# Patient Record
Sex: FEMALE | Race: WHITE | Employment: OTHER | URBAN - METROPOLITAN AREA
[De-identification: names, ages, dates, MRNs, and addresses within clinical notes are randomized per-mention and may not be internally consistent; named-entity substitution may affect disease eponyms.]

---

## 2017-11-28 ENCOUNTER — HOSPITAL ENCOUNTER (OUTPATIENT)
Dept: PHYSICAL THERAPY | Age: 82
Discharge: HOME | End: 2017-11-28
Attending: INTERNAL MEDICINE
Payer: COMMERCIAL

## 2017-11-28 DIAGNOSIS — M25.519 SHOULDER PAIN, UNSPECIFIED CHRONICITY, UNSPECIFIED LATERALITY: ICD-10-CM

## 2017-11-28 PROCEDURE — G8984 CARRY CURRENT STATUS: HCPCS

## 2017-11-28 PROCEDURE — 97161 PT EVAL LOW COMPLEX 20 MIN: CPT

## 2017-11-28 PROCEDURE — G8985 CARRY GOAL STATUS: HCPCS

## 2017-11-28 NOTE — PROGRESS NOTES
Ambulatory/Rehab Services H2 Model Falls Risk Assessment    Risk Factor Pts. ·   Confusion/Disorientation/Impulsivity  []    4 ·   Symptomatic Depression  []   2 ·   Altered Elimination  [x]   1 ·   Dizziness/Vertigo  []   1 ·   Gender (Male)  []   1 ·   Any administered antiepileptics (anticonvulsants):  []   2 ·   Any administered benzodiazepines:  []   1 ·   Visual Impairment (specify):  []   1 ·   Portable Oxygen Use  []   1 ·   Orthostatic ? BP  [x]   1 ·   History of Recent Falls (within 3 mos.)  []   5     Ability to Rise from Chair (choose one) Pts. ·   Ability to rise in a single movement  [x]   0 ·   Pushes up, successful in one attempt  []   1 ·   Multiple attempts, but successful  []   3 ·   Unable to rise without assistance  []   4   Total: (5 or greater = High Risk) 2     Falls Prevention Plan:   []                Physical Limitations to Exercise (specify):   []                Mobility Assistance Device (type):   []                Exercise/Equipment Adaptation (specify):    ©2010 [facility] [address] Patent #[id number].  Federal Law prohibits the replication, distribution or use without written permission from [facility] [employer]

## 2017-11-28 NOTE — THERAPY EVALUATION
Hosea Irwin  : 1934  Primary: Rivera Curtis [facility]  Secondary:  [address] at [facility] SAIDA BUSH  [address] [facility], [address] [place] [address],[address], [facility] [address]  [phone]   [phone]         OUTPATIENT PHYSICAL THERAPY:Initial Assessment 2017    ICD-10: Treatment Diagnosis: Pain in left shoulder (M25.512)  Precautions/Allergies: NKDA     Fall Risk Score: 2 (? 5 = High Risk)  MD Orders:Eval and treat MEDICAL/REFERRING DIAGNOSIS:  Shoulder pain, unspecified chronicity, unspecified laterality [M25.519]    DATE OF ONSET: about 2 months ago  REFERRING PHYSICIAN: Celestina Mensah MD  RETURN PHYSICIAN APPOINTMENT: TBD     INITIAL ASSESSMENT:  Ms. Chaparrita Bates is a 80year old R hand dominant female with complaints of L shoulder pain over the past couple of months. She presents with severe pain in L shoulder, decreased ROM and strength in L shoulder and decreased functional use of L UE. She could benefit from PT to address defiicits and work toward goals. She also plans to see shoulder specialist.    PROBLEM LIST (Impacting functional limitations):  1. Decreased Strength  2. Decreased ADL/Functional Activities  3. Increased Pain  4. Decreased Flexibility/Joint Mobility INTERVENTIONS PLANNED:  1. Home Exercise Program (HEP)  2. Manual Therapy  3. Therapeutic Exercise/Strengthening  4. modals as needed   TREATMENT PLAN:  Effective Dates: 17 TO 18. Frequency/Duration: 1-2 times a week for 90 days  GOALS: (Goals have been discussed and agreed upon with patient.)  Short-Term Functional Goals: Time Frame: 6 weeks  1. Patient to be independent with HEP  2. Patient to report decrease in shoulder pain to <= 3  Discharge Goals: Time Frame: 90 days  1. Patient to report no more than minimal L shoulder pain with functional activity  2. Patient to improve DASH score to 19 to demo improved use of L UE  3. Patient to increase AROM L shoulder flexion to 135 degrees for improved use of L UE. Rehabilitation Potential For Stated Goals: guarded  Regarding Chaparrita Ruffin's therapy, I certify that the treatment plan above will be carried out by a therapist or under their direction. Thank you for this referral,    Amisha Gordon PT     Referring Physician Signature: Nina Mcgill MD              Date                    The information in this section was collected on 17 (except where otherwise noted). HISTORY:   History of Present Injury/Illness (Reason for Referral):  Patient reports that L shoulder pain started about 2 months ago. She went to MD and had 2 separate injections, each of which lasted a couple of weeks. Shoulder seems to be getting worse now and no xrays have been done yet. She can't sleep due to pain. She expressed desire to see a shoulder specialist.   Past Medical History/Comorbidities: CAD, Cognitive impairment, insomnia, left ventricular dysfunction, appendectomy, bunionextomy, heart catheterization, carpal tunnel release, cataract removal, , CABG. Social History/Living Environment:     lives with . 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 Dominant Side:         RIGHT    Current Medications:  Aspirin, Atorvastatin, Calcium, Magnesium oxide, Multivitamin, Mirtazapine, Donepezil    Date Last Reviewed:  17   Number of Personal Factors/Comorbidities that affect the Plan of Care: 1-2: MODERATE COMPLEXITY   EXAMINATION:   Observation/Orthostatic Postural Assessment:          Rounded shoulder posture. Palpation:         Tender L lateral shoulder, over bursa. ROM:          AROM R shoulder flex 150, abduct 150, extn 60, ER 65 and IR T10        AROM L shoulder flex 80, abduct 64, extn 40, ER 48 and IR to top of L buttock.   PROM L shoulder flex 90, abduct 70, ER 45 and IR 50 in 45 degree abduct  Strength:          R UE grossly 4=/5 except 4/5 in ER        L UE <3 and painful throughout  Special Tests:          Positive impingement tests L shoulder. Neurological Screen:        Sensation: light touch intact in B UEs       Body Structures Involved:  1. Joints  2. Muscles Body Functions Affected:  1. Neuromusculoskeletal  2. Movement Related Activities and Participation Affected:  1. General Tasks and Demands  2. Self Care  3. Community, Social and [place]   Number of elements (examined above) that affect the Plan of Care: 1-2: LOW COMPLEXITY   CLINICAL PRESENTATION:   Presentation: Evolving clinical presentation with changing clinical characteristics: MODERATE COMPLEXITY   CLINICAL DECISION MAKING:   Outcome Measure: Tool Used: Disabilities of the Arm, Shoulder and Hand (DASH) Questionnaire - Quick Version  Score:  Initial: 38/55  Most Recent: X/55 (Date: -- )   Interpretation of Score: The DASH is designed to measure the activities of daily living in person's with upper extremity dysfunction or pain. Each section is scored on a 1-5 scale, 5 representing the greatest disability. The scores of each section are added together for a total score of 55. Score 11 12-19 20-28 29-37 38-45 46-54 55   Modifier CH CI CJ CK CL CM CN     ? Carrying, Moving, and Handling Objects:     - CURRENT STATUS: CL - 60%-79% impaired, limited or restricted    - GOAL STATUS: CI - 1%-19% impaired, limited or restricted    - D/C STATUS:  ---------------To be determined---------------        Medical Necessity:   · Patient demonstrates guarded rehab potential due to higher previous functional level. Reason for Services/Other Comments:  · Patient continues to require skilled intervention due to need to reduce shoulder pain.    Use of outcome tool(s) and clinical judgement create a POC that gives a: Questionable prediction of patient's progress: MODERATE COMPLEXITY            TREATMENT:   (In addition to Assessment/Re-Assessment sessions the following treatments were rendered)  Pre-treatment Symptoms/Complaints:  Pain in L shoulder. Pain: Initial:   Pain Intensity 1: 7 (currently 7, worst of 10)   Post Session:  No change in pain levels        No treatment today. Patient is going to see orthopedic doctor before proceeding. Treatment/Session Assessment:    · Response to Treatment:  No increase in pain. Patient was planning to see ortho next week. · Compliance with Program/Exercises: Will assess as treatment progresses. · Recommendations/Intent for next treatment session: \"Next visit will focus on decreasing pain. \".   Total Treatment Duration: 50 minutes  PT Patient Time In/Time Out  Time In: 1000  Time Out: 1050Steven Dave Farrell PT

## 2017-12-19 ENCOUNTER — HOSPITAL ENCOUNTER (OUTPATIENT)
Dept: MAMMOGRAPHY | Age: 82
Discharge: HOME | End: 2017-12-19
Attending: INTERNAL MEDICINE
Payer: COMMERCIAL

## 2017-12-19 DIAGNOSIS — Z12.31 VISIT FOR SCREENING MAMMOGRAM: ICD-10-CM

## 2017-12-19 PROCEDURE — 77067 SCR MAMMO BI INCL CAD: CPT

## 2018-01-16 NOTE — THERAPY DISCHARGE
Tommy Antoine  : 1934  Primary: Radha Mijares [facility]  Secondary:  [address]  at [facility] SAIDA BUSH  [address] [facility], [address] [place] [address] [address], [place] [address]  [phone]   [phone]         OUTPATIENT PHYSICAL THERAPY:Discontinuation Summary 2017    ICD-10: Treatment Diagnosis: Pain in left shoulder (M25.512)  Precautions/Allergies: NKDA     Fall Risk Score: 2 (? 5 = High Risk)  MD Orders:Eval and treat MEDICAL/REFERRING DIAGNOSIS:  Shoulder pain, unspecified chronicity, unspecified laterality [M25.519]    DATE OF ONSET: about 2 months ago  REFERRING PHYSICIAN: Hannah Boudreaux MD  RETURN PHYSICIAN APPOINTMENT: TBD     INITIAL ASSESSMENT:  Ms. Amber Dave is a 80year old R hand dominant female with complaints of L shoulder pain over the past couple of months. She presented with severe pain in L shoulder, decreased ROM and strength in L shoulder and decreased functional use of L UE. She did not return to PT after initial evaluation and she will now be discontinued from PT. PLAN: Discontinue PT. Unable to assess progress toward goals. GOALS: (Goals have been discussed and agreed upon with patient.)  Short-Term Functional Goals: Time Frame: 6 weeks  1. Patient to be independent with HEP  2. Patient to report decrease in shoulder pain to <= 3  Discharge Goals: Time Frame: 90 days  1. Patient to report no more than minimal L shoulder pain with functional activity  2. Patient to improve DASH score to 19 to demo improved use of L UE  3. Patient to increase AROM L shoulder flexion to 135 degrees for improved use of L UE. Rehabilitation Potential For Stated Goals: guarded     Thank you for this referral,    Diego Camarillo, PT                The information in this section was collected on 17 (except where otherwise noted). HISTORY:   History of Present Injury/Illness (Reason for Referral):  Patient reports that L shoulder pain started about 2 months ago.  She went to MD and had 2 separate injections, each of which lasted a couple of weeks. Shoulder seems to be getting worse now and no xrays have been done yet. She can't sleep due to pain. She expressed desire to see a shoulder specialist.   Past Medical History/Comorbidities: CAD, Cognitive impairment, insomnia, left ventricular dysfunction, appendectomy, bunionextomy, heart catheterization, carpal tunnel release, cataract removal, , CABG. Social History/Living Environment:     lives with . 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 Dominant Side:         RIGHT    Current Medications:  Aspirin, Atorvastatin, Calcium, Magnesium oxide, Multivitamin, Mirtazapine, Donepezil    Date Last Reviewed:  17   EXAMINATION:   Observation/Orthostatic Postural Assessment:          Rounded shoulder posture. Palpation:         Tender L lateral shoulder, over bursa. ROM:          AROM R shoulder flex 150, abduct 150, extn 60, ER 65 and IR T10        AROM L shoulder flex 80, abduct 64, extn 40, ER 48 and IR to top of L buttock. PROM L shoulder flex 90, abduct 70, ER 45 and IR 50 in 45 degree abduct  Strength:          R UE grossly 4=/5 except 4/5 in ER        L UE <3 and painful throughout  Special Tests:          Positive impingement tests L shoulder. Neurological Screen:        Sensation: light touch intact in B UEs       Body Structures Involved:  1. Joints  2. Muscles Body Functions Affected:  1. Neuromusculoskeletal  2. Movement Related Activities and Participation Affected:  1. General Tasks and Demands  2. Self Care  3. Community, Social and Civ[employer] [place]   CLINICAL DECISION MAKING:   Outcome Measure: Tool Used: Disabilities of the Arm, Shoulder and Hand (DASH) Questionnaire - Quick Version  Score:  Initial:   Most Recent:  (Date: -- )   Interpretation of Score:  The DASH is designed to measure the activities of daily living in person's with upper extremity dysfunction or pain. Each section is scored on a 1-5 scale, 5 representing the greatest disability. The scores of each section are added together for a total score of 55. Score 11 12-19 20-28 29-37 38-45 46-54 55   Modifier CH CI CJ CK CL CM CN     ? Carrying, Moving, and Handling Objects:     - CURRENT STATUS: CL - 60%-79% impaired, limited or restricted    - GOAL STATUS: CI - 1%-19% impaired, limited or restricted    - D/C STATUS:  ---------------To be determined---------------        Medical Necessity:   · Patient demonstrates guarded rehab potential due to higher previous functional level. Reason for Services/Other Comments:  · Patient continues to require skilled intervention due to need to reduce shoulder pain. TREATMENT:   (In addition to Assessment/Re-Assessment sessions the following treatments were rendered)  Pre-treatment Symptoms/Complaints:  Pain in L shoulder. Pain: Initial:   Pain Intensity 1: 7 (currently 7, worst of 10)   Post Session:  No change in pain levels        No treatment today. Patient is going to see orthopedic doctor before proceeding.       Debbi Ferrell, PT

## 2018-02-22 ENCOUNTER — HOSPITAL ENCOUNTER (OUTPATIENT)
Dept: PHYSICAL THERAPY | Age: 83
Discharge: HOME | End: 2018-02-22
Payer: COMMERCIAL

## 2018-02-22 PROCEDURE — G8984 CARRY CURRENT STATUS: HCPCS

## 2018-02-22 PROCEDURE — 97161 PT EVAL LOW COMPLEX 20 MIN: CPT

## 2018-02-22 PROCEDURE — G8985 CARRY GOAL STATUS: HCPCS

## 2018-02-22 NOTE — THERAPY EVALUATION
Marita Cota  : 1934  Primary: Claudia Carrera Karen Hassan  Secondary:  [facility] SAIDA RANDOLPH[address], [address] [place] [address],[address], [facility] [address]  [phone]   [phone]         OUTPATIENT PHYSICAL THERAPY:Initial Assessment 2018    ICD-10: Treatment Diagnosis: Pain in left shoulder (M25.512)  Precautions/Allergies: NKDA     Fall Risk Score: 7 (? 5 = High Risk)  MD Orders: Eval and treat, HEP, ROM, strengthening, aggressive motion 3x/wk for 5 weeks MEDICAL/REFERRING DIAGNOSIS:  L shoulder pain  DATE OF ONSET: ? - patient unsure  REFERRING PHYSICIAN: Simon Lauren MD  RETURN PHYSICIAN APPOINTMENT: TBD     INITIAL ASSESSMENT:  Ms. Maria Fernanda Garner is a 80year old R hand dominant female whose L shoulder pain started after she moved a mattress. She presents with L shoulder pain, very decreased ROM and strength in L shoulder and decreased functional use of L UE. She could benefit from PT to address deficits and work toward goals. PROBLEM LIST (Impacting functional limitations):  1. Decreased Strength  2. Decreased ADL/Functional Activities  3. Increased Pain  4. Decreased Flexibility/Joint Mobility INTERVENTIONS PLANNED:  1. Home Exercise Program (HEP)  2. Manual Therapy  3. Range of Motion (ROM)  4. Therapeutic Exercise/Strengthening  5. modals as needed   TREATMENT PLAN:  Effective Dates:18 TO 18. Frequency/Duration: 2-3 times a week for 90 Days  GOALS: (Goals have been discussed and agreed upon with patient.)  Patient's stated goal is to get rid of pain and get normal use of L UE back. Short-Term Functional Goals: Time Frame: 6 weeks  1. Patient to be independent with HEP  2. Patient to report decrease in resting pain levels to 4/10.  3. Patient to increase PROM L shoulder flex to 120 degrees  Discharge Goals: Time Frame: 90 days  1. Patient to report no more than minimal L shoulder pain with all activity  2.  Patient to improve DASH score to 24 to demo improved use of L UE  3. Patient to increase AROM L shoulder flex to 135 for improved functional use  Rehabilitation Potential For Stated Goals: [address], I certify that the treatment plan above will be carried out by a therapist or under their direction. Thank you for this referral,      Mary Dawson, PT     Referring Physician Signature: Yessi Connor MD          Date                 Do not click here        The information in this section was collected on 18 (except where otherwise noted). HISTORY:   History of Present Injury/Illness (Reason for Referral):  Patient reports she injured her shoulder around last November when she moved a mattress at home. She came for PT eval, but fell and broke 'tail bone' after that and couldn't return to PT. She went to see Dr. Katy Vargas and shoulder was injected and that provided relief for a couple of months, but then the effects wore off. She is now referred to PT. Past Medical History/Comorbidities: CAD, Cognitive impairment, insomnia, left ventricular dysfunction, appendectomy, bunionextomy, heart catheterization, carpal tunnel release, cataract removal, , CABG. 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 Dominant Side:         RIGHT  Current Medications:  Aspirin, Atorvastatin, Calcium, Magnesium oxide, Multivitamin, Mirtazapine, Donepezil - No change since last PT, per patient.    Date Last Reviewed:  18   Number of Personal Factors/Comorbidities that affect the Plan of Care: 1-2: MODERATE COMPLEXITY   EXAMINATION:   Observation/Orthostatic Postural Assessment:          Rounded shoulder posture  Palpation:          Tender over whole L shoulder, especially anterior  ROM:          AROM R shoulder flex 140, extn 60, abduct 155, ER 60, IR T9        AROM L shoulder flex 68, extn 30, abduct 60, ER 10 and IR to L hip        PROM L shoulder flex 85, abduct 60, ER 65 and IR 45 at 45 degree abduct  Strength:          R shoulder 5 except 4+ in ER. L shoulder painful with strength testing in all directions  Special Tests:          Could not elevate L shoulder enough for special tests  Neurological Screen:        Sensation: light touch intact in B UEs   Body Structures Involved:  1. Joints  2. Muscles Body Functions Affected:  1. Neuromusculoskeletal  2. Movement Related Activities and Participation Affected:  1. General Tasks and Demands  2. Self Care  3. Community, Social and [place]   Number of elements (examined above) that affect the Plan of Care: 1-2: LOW COMPLEXITY   CLINICAL PRESENTATION:   Presentation: Evolving clinical presentation with changing clinical characteristics: MODERATE COMPLEXITY   CLINICAL DECISION MAKING:   Outcome Measure: Tool Used: Disabilities of the Arm, Shoulder and Hand (DASH) Questionnaire - Quick Version  Score:  Initial: 37/55  Most Recent: X/55 (Date: -- )   Interpretation of Score: The DASH is designed to measure the activities of daily living in person's with upper extremity dysfunction or pain. Each section is scored on a 1-5 scale, 5 representing the greatest disability. The scores of each section are added together for a total score of 55. Score 11 12-19 20-28 29-37 38-45 46-54 55   Modifier CH CI CJ CK CL CM CN     ? Carrying, Moving, and Handling Objects:     - CURRENT STATUS: CK - 40%-59% impaired, limited or restricted    - GOAL STATUS: CJ - 20%-39% impaired, limited or restricted    - D/C STATUS:  ---------------To be determined---------------      Medical Necessity:   · Patient demonstrates fair rehab potential due to higher previous functional level. Reason for Services/Other Comments:  · Patient continues to require skilled intervention due to need to regain use of L UE.    Use of outcome tool(s) and clinical judgement create a POC that gives a: Questionable prediction of patient's progress: MODERATE COMPLEXITY            TREATMENT:   (In addition to Assessment/Re-Assessment sessions the following treatments were rendered)  Pre-treatment Symptoms/Complaints:  Pain, inability to use L UE  Pain: Initial:   Pain Intensity 1: 8 (8 currently, up to 10 at times)  Post Session:  Pain not rated at end of session     Therapeutic Exercise: ( ):   5 min - Exercises to improve mobility and strength. Required moderate visual, verbal and manual cues to instruct in exercises. Progressed complexity of movement as indicated. Instructed patient in wand flexion, wand abduction and wand ER exercises - all done in supine  HEP - gave written copy of HEP     Treatment/Session Assessment:    · Response to Treatment:  Patient able to do exercises, and written HEP given, but patient has cognitive issues which may affect performance of HEP correctly. · Compliance with Program/Exercises: Will assess as treatment progresses. · Recommendations/Intent for next treatment session: \"Next visit will focus on shoulder ROM\".   Total Treatment Duration:   47 minutes  PT Patient Time In/Time Out  Time In: 0928  Time Out: 62 [facility]

## 2018-02-22 NOTE — PROGRESS NOTES
Ambulatory/Rehab Services H2 Model Falls Risk Assessment    Risk Factor Pts. ·   Confusion/Disorientation/Impulsivity  []    4 ·   Symptomatic Depression  []   2 ·   Altered Elimination  [x]   1 ·   Dizziness/Vertigo  []   1 ·   Gender (Male)  []   1 ·   Any administered antiepileptics (anticonvulsants):  []   2 ·   Any administered benzodiazepines:  []   1 ·   Visual Impairment (specify):  []   1 ·   Portable Oxygen Use  []   1 ·   Orthostatic ? BP  [x]   1 ·   History of Recent Falls (within 3 mos.)  [x]   5     Ability to Rise from Chair (choose one) Pts. ·   Ability to rise in a single movement  [x]   0 ·   Pushes up, successful in one attempt  []   1 ·   Multiple attempts, but successful  []   3 ·   Unable to rise without assistance  []   4   Total: (5 or greater = High Risk) 7     Falls Prevention Plan:   [x]                Physical Limitations to Exercise (specify): watch balance during exercise   []                Mobility Assistance Device (type):   []                Exercise/Equipment Adaptation (specify):    ©2010 [employer] [facility] [employer] [address] [place] [address] Patent #[id number].  Federal Law prohibits the replication, distribution or use without written permission from [employer]

## 2018-02-27 ENCOUNTER — HOSPITAL ENCOUNTER (OUTPATIENT)
Dept: PHYSICAL THERAPY | Age: 83
Discharge: HOME | End: 2018-02-27
Payer: COMMERCIAL

## 2018-02-27 PROCEDURE — 97140 MANUAL THERAPY 1/> REGIONS: CPT

## 2018-02-27 PROCEDURE — 97110 THERAPEUTIC EXERCISES: CPT

## 2018-02-27 NOTE — PROGRESS NOTES
Stephen Chacon  : 1934  Primary: Estefani Falljake Jones Rpn  Secondary:  [facility] SAIDA BUSH  [address] [facility], [address] [place] [address],[address], [address]  [phone]   [phone]         OUTPATIENT PHYSICAL THERAPY:Daily Note 2018    ICD-10: Treatment Diagnosis: Pain in left shoulder (M25.512)  Precautions/Allergies: NKDA     Fall Risk Score: 7 (? 5 = High Risk)  MD Orders: Eval and treat, HEP, ROM, strengthening, aggressive motion 3x/wk for 5 weeks MEDICAL/REFERRING DIAGNOSIS:  L shoulder pain  DATE OF ONSET: November? - patient unsure  REFERRING PHYSICIAN: Hiral Emmanuel MD  RETURN PHYSICIAN APPOINTMENT: TBD     INITIAL ASSESSMENT:  Ms. Isaiah Solano is a 80year old R hand dominant female whose L shoulder pain started after she moved a mattress. She presents with L shoulder pain, very decreased ROM and strength in L shoulder and decreased functional use of L UE. She could benefit from PT to address deficits and work toward goals. PROBLEM LIST (Impacting functional limitations):  1. Decreased Strength  2. Decreased ADL/Functional Activities  3. Increased Pain  4. Decreased Flexibility/Joint Mobility INTERVENTIONS PLANNED:  1. Home Exercise Program (HEP)  2. Manual Therapy  3. Range of Motion (ROM)  4. Therapeutic Exercise/Strengthening  5. modals as needed   TREATMENT PLAN:  Effective Dates:18 TO 18. Frequency/Duration: 2-3 times a week for 90 Days  GOALS: (Goals have been discussed and agreed upon with patient.)  Patient's stated goal is to get rid of pain and get normal use of L UE back. Short-Term Functional Goals: Time Frame: 6 weeks  1. Patient to be independent with HEP  2. Patient to report decrease in resting pain levels to 4/10.  3. Patient to increase PROM L shoulder flex to 120 degrees  Discharge Goals: Time Frame: 90 days  1. Patient to report no more than minimal L shoulder pain with all activity  2.  Patient to improve DASH score to 24 to demo improved use of L UE  3. Patient to increase AROM L shoulder flex to 135 for improved functional use  Rehabilitation Potential For Stated Goals: Fair         Do not click here        The information in this section was collected on 18 (except where otherwise noted). HISTORY:   History of Present Injury/Illness (Reason for Referral):  Patient reports she injured her shoulder around last November when she moved a mattress at home. She came for PT eval, but fell and broke 'tail bone' after that and couldn't return to PT. She went to see Dr. Karlynn Jeans and shoulder was injected and that provided relief for a couple of months, but then the effects wore off. She is now referred to PT. Past Medical History/Comorbidities: CAD, Cognitive impairment, insomnia, left ventricular dysfunction, appendectomy, bunionextomy, heart catheterization, carpal tunnel release, cataract removal, , CABG. 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 Dominant Side:         RIGHT  Current Medications:  Aspirin, Atorvastatin, Calcium, Magnesium oxide, Multivitamin, Mirtazapine, Donepezil - No change since last PT, per patient. Date Last Reviewed:  18   EXAMINATION:   Observation/Orthostatic Postural Assessment:          Rounded shoulder posture  Palpation:          Tender over whole L shoulder, especially anterior  ROM:          AROM R shoulder flex 140, extn 60, abduct 155, ER 60, IR T9        AROM L shoulder flex 68, extn 30, abduct 60, ER 10 and IR to L hip        PROM L shoulder flex 85, abduct 60, ER 65 and IR 45 at 45 degree abduct  Strength:          R shoulder 5 except 4+ in ER.   L shoulder painful with strength testing in all directions  Special Tests:          Could not elevate L shoulder enough for special tests  Neurological Screen:        Sensation: light touch intact in B UEs   Body Structures Involved:  1. Joints  2. Muscles Body Functions Affected:  1. Neuromusculoskeletal  2. Movement Related Activities and Participation Affected:  1. General Tasks and Demands  2. Self Care  3. Community, Social and Civic Life   CLINICAL DECISION MAKING:   Outcome Measure: Tool Used: Disabilities of the Arm, Shoulder and Hand (DASH) Questionnaire - Quick Version  Score:  Initial: 37/55  Most Recent: X/55 (Date: -- )   Interpretation of Score: The DASH is designed to measure the activities of daily living in person's with upper extremity dysfunction or pain. Each section is scored on a 1-5 scale, 5 representing the greatest disability. The scores of each section are added together for a total score of 55. Score 11 12-19 20-28 29-37 38-45 46-54 55   Modifier CH CI CJ CK CL CM CN     ? Carrying, Moving, and Handling Objects:     - CURRENT STATUS: CK - 40%-59% impaired, limited or restricted    - GOAL STATUS: CJ - 20%-39% impaired, limited or restricted    - D/C STATUS:  ---------------To be determined---------------      Medical Necessity:   · Patient demonstrates fair rehab potential due to higher previous functional level. Reason for Services/Other Comments:  · Patient continues to require skilled intervention due to need to regain use of L UE.            TREATMENT:   (In addition to Assessment/Re-Assessment sessions the following treatments were rendered)  Pre-treatment Symptoms/Complaints:  Patient reports compliance with HEP. Reports that the exercises help her shoulder feel better. Pain: Initial:   Pain Intensity 1: 8  Post Session:  Pain was less at end of session, per patient     Therapeutic Exercise: (10 Minutes):   Exercises to improve mobility and strength. Required moderate visual, verbal and manual cues to instruct in exercises. Progressed complexity of movement as indicated.   Manually resisted 5 way shoulder isometrics in supine - 3 to 5 second hold,   2 x 10 each  Manual Therapy ( 30 minutes) - Grade 2 to 4- physio mobs L shoulder flex, abduct, IR and ER. Grade 2 shoulder oscillations. HEP - continue current HEP     Treatment/Session Assessment:    · Response to Treatment:  Patient notes that shoulder feels better after treatment. · Compliance with Program/Exercises: yes  · Recommendations/Intent for next treatment session: \"Next visit will focus on shoulder ROM\".   Total Treatment Duration:   40 minutes  PT Patient Time In/Time Out  Time In: 7411  Time Out: 682 [facility]

## 2018-03-01 ENCOUNTER — HOSPITAL ENCOUNTER (OUTPATIENT)
Dept: PHYSICAL THERAPY | Age: 83
Discharge: HOME | End: 2018-03-01
Payer: COMMERCIAL

## 2018-03-01 PROCEDURE — 97110 THERAPEUTIC EXERCISES: CPT

## 2018-03-01 PROCEDURE — 97140 MANUAL THERAPY 1/> REGIONS: CPT

## 2018-03-01 NOTE — PROGRESS NOTES
Daron Jewell  : 1934  Primary: Zohreh Trevino Karen Rpn  Secondary:  [facility] SAIDA BUSH  [address] [place] [address], [address] [place] [address],[address], [facility] [address]  [phone]   [phone]         OUTPATIENT PHYSICAL THERAPY:Daily Note 3/1/2018    ICD-10: Treatment Diagnosis: Pain in left shoulder (M25.512)  Precautions/Allergies: NKDA     Fall Risk Score: 7 (? 5 = High Risk)  MD Orders: Eval and treat, HEP, ROM, strengthening, aggressive motion 3x/wk for 5 weeks MEDICAL/REFERRING DIAGNOSIS:  L shoulder pain  DATE OF ONSET: November? - patient unsure  REFERRING PHYSICIAN: Darline Acosta MD  RETURN PHYSICIAN APPOINTMENT: TBD     INITIAL ASSESSMENT:  Ms. Viviana Bhat is a 80year old R hand dominant female whose L shoulder pain started after she moved a mattress. She presents with L shoulder pain, very decreased ROM and strength in L shoulder and decreased functional use of L UE. She could benefit from PT to address deficits and work toward goals. PROBLEM LIST (Impacting functional limitations):  1. Decreased Strength  2. Decreased ADL/Functional Activities  3. Increased Pain  4. Decreased Flexibility/Joint Mobility INTERVENTIONS PLANNED:  1. Home Exercise Program (HEP)  2. Manual Therapy  3. Range of Motion (ROM)  4. Therapeutic Exercise/Strengthening  5. modals as needed   TREATMENT PLAN:  Effective Dates:18 TO 18. Frequency/Duration: 2-3 times a week for 90 Days  GOALS: (Goals have been discussed and agreed upon with patient.)  Patient's stated goal is to get rid of pain and get normal use of L UE back. Short-Term Functional Goals: Time Frame: 6 weeks  1. Patient to be independent with HEP  2. Patient to report decrease in resting pain levels to 4/10.  3. Patient to increase PROM L shoulder flex to 120 degrees  Discharge Goals: Time Frame: 90 days  1. Patient to report no more than minimal L shoulder pain with all activity  2.  Patient to improve DASH score to 24 to demo improved use of L UE  3. Patient to increase AROM L shoulder flex to 135 for improved functional use  Rehabilitation Potential For Stated Goals: Fair         Do not click here        The information in this section was collected on 18 (except where otherwise noted). HISTORY:   History of Present Injury/Illness (Reason for Referral):  Patient reports she injured her shoulder around last November when she moved a mattress at home. She came for PT eval, but fell and broke 'tail bone' after that and couldn't return to PT. She went to see Dr. Yosef Rubin and shoulder was injected and that provided relief for a couple of months, but then the effects wore off. She is now referred to PT. Past Medical History/Comorbidities: CAD, Cognitive impairment, insomnia, left ventricular dysfunction, appendectomy, bunionextomy, heart catheterization, carpal tunnel release, cataract removal, , CABG. 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 Dominant Side:         RIGHT  Current Medications:  Aspirin, Atorvastatin, Calcium, Magnesium oxide, Multivitamin, Mirtazapine, Donepezil - No change since last PT, per patient. Date Last Reviewed:  18   EXAMINATION:   Observation/Orthostatic Postural Assessment:          Rounded shoulder posture  Palpation:          Tender over whole L shoulder, especially anterior  ROM:          AROM R shoulder flex 140, extn 60, abduct 155, ER 60, IR T9        AROM L shoulder flex 68, extn 30, abduct 60, ER 10 and IR to L hip        PROM L shoulder flex 85, abduct 60, ER 65 and IR 45 at 45 degree abduct  Strength:          R shoulder 5 except 4+ in ER.   L shoulder painful with strength testing in all directions  Special Tests:          Could not elevate L shoulder enough for special tests  Neurological Screen:        Sensation: light touch intact in B UEs   Body Structures Involved:  1. Joints  2. Muscles Body Functions Affected:  1. Neuromusculoskeletal  2. Movement Related Activities and Participation Affected:  1. General Tasks and Demands  2. Self Care  3. Community, Social and Civic Life   CLINICAL DECISION MAKING:   Outcome Measure: Tool Used: Disabilities of the Arm, Shoulder and Hand (DASH) Questionnaire - Quick Version  Score:  Initial: 37/55  Most Recent: X/55 (Date: -- )   Interpretation of Score: The DASH is designed to measure the activities of daily living in person's with upper extremity dysfunction or pain. Each section is scored on a 1-5 scale, 5 representing the greatest disability. The scores of each section are added together for a total score of 55. Score 11 12-19 20-28 29-37 38-45 46-54 55   Modifier CH CI CJ CK CL CM CN     ? Carrying, Moving, and Handling Objects:     - CURRENT STATUS: CK - 40%-59% impaired, limited or restricted    - GOAL STATUS: CJ - 20%-39% impaired, limited or restricted    - D/C STATUS:  ---------------To be determined---------------      Medical Necessity:   · Patient demonstrates fair rehab potential due to higher previous functional level. Reason for Services/Other Comments:  · Patient continues to require skilled intervention due to need to regain use of L UE.            TREATMENT:   (In addition to Assessment/Re-Assessment sessions the following treatments were rendered)  Pre-treatment Symptoms/Complaints:  Patient reports compliance with HEP. Reports that the exercises help her shoulder feel better. No new problems. Pain: Initial:   Pain Intensity 1: 7  Post Session:  Pain was less at end of session again, per patient     Therapeutic Exercise: (10 Minutes):   Exercises to improve mobility and strength. Required moderate visual, verbal and manual cues to ensure correct performance. Progressed resistance slightly.   Manually resisted 5 way shoulder isometrics in supine - 3 to 5 second hold,   2 x 10 each  Manual Therapy ( 30 minutes) - Grade 2 to 4- physio mobs L shoulder flex, abduct, IR and ER. Grade 2 shoulder oscillations. HEP - continue current HEP     Treatment/Session Assessment:    · Response to Treatment:  Patient notes that shoulder again feels better after treatment. She continues to have pain,  but ROM appears to be improving. · Compliance with Program/Exercises: yes  · Recommendations/Intent for next treatment session: \"Next visit will focus on shoulder ROM\".   Total Treatment Duration:   40 minutes  PT Patient Time In/Time Out  Time In: 0900  Time Out: [address] [facility] Mary Carmen Chaves, TAJ

## 2018-03-06 ENCOUNTER — HOSPITAL ENCOUNTER (OUTPATIENT)
Dept: PHYSICAL THERAPY | Age: 83
Discharge: HOME | End: 2018-03-06
Payer: COMMERCIAL

## 2018-03-06 PROCEDURE — 97140 MANUAL THERAPY 1/> REGIONS: CPT

## 2018-03-06 NOTE — PROGRESS NOTES
Lucretia Fearing  : 1934  Primary: Fabiana Jones Rpn  Secondary:  [facility] SA[facility] B[facility]  [facility], [address], [address] [facility]  [phone]   [phone]         OUTPATIENT PHYSICAL THERAPY:Daily Note 3/6/2018    ICD-10: Treatment Diagnosis: Pain in left shoulder (M25.512)  Precautions/Allergies: NKDA     Fall Risk Score: 7 (? 5 = High Risk)  MD Orders: Eval and treat, HEP, ROM, strengthening, aggressive motion 3x/wk for 5 weeks MEDICAL/REFERRING DIAGNOSIS:  L shoulder pain  DATE OF ONSET: November? - patient unsure  REFERRING PHYSICIAN: Hannah Granados MD  RETURN PHYSICIAN APPOINTMENT: TBD     INITIAL ASSESSMENT:  Ms. Luis Enrique Bass is a 80year old R hand dominant female whose L shoulder pain started after she moved a mattress. She presents with L shoulder pain, very decreased ROM and strength in L shoulder and decreased functional use of L UE. She could benefit from PT to address deficits and work toward goals. PROBLEM LIST (Impacting functional limitations):  1. Decreased Strength  2. Decreased ADL/Functional Activities  3. Increased Pain  4. Decreased Flexibility/Joint Mobility INTERVENTIONS PLANNED:  1. Home Exercise Program (HEP)  2. Manual Therapy  3. Range of Motion (ROM)  4. Therapeutic Exercise/Strengthening  5. modals as needed   TREATMENT PLAN:  Effective Dates:18 TO 18. Frequency/Duration: 2-3 times a week for 90 Days  GOALS: (Goals have been discussed and agreed upon with patient.)  Patient's stated goal is to get rid of pain and get normal use of L UE back. Short-Term Functional Goals: Time Frame: 6 weeks  1. Patient to be independent with HEP  2. Patient to report decrease in resting pain levels to 4/10.  3. Patient to increase PROM L shoulder flex to 120 degrees  Discharge Goals: Time Frame: 90 days  1. Patient to report no more than minimal L shoulder pain with all activity  2.  Patient to improve DASH score to 24 to demo improved use of L UE  3. Patient to increase AROM L shoulder flex to 135 for improved functional use  Rehabilitation Potential For Stated Goals: Fair         Do not click here        The information in this section was collected on 18 (except where otherwise noted). HISTORY:   History of Present Injury/Illness (Reason for Referral):  Patient reports she injured her shoulder around last November when she moved a mattress at home. She came for PT eval, but fell and broke 'tail bone' after that and couldn't return to PT. She went to see Dr. Brendalyn Koyanagi and shoulder was injected and that provided relief for a couple of months, but then the effects wore off. She is now referred to PT. Past Medical History/Comorbidities: CAD, Cognitive impairment, insomnia, left ventricular dysfunction, appendectomy, bunionextomy, heart catheterization, carpal tunnel release, cataract removal, , CABG. 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 Dominant Side:         RIGHT  Current Medications:  Aspirin, Atorvastatin, Calcium, Magnesium oxide, Multivitamin, Mirtazapine, Donepezil - No change since last PT, per patient. Date Last Reviewed:  3/6/18   EXAMINATION:   Observation/Orthostatic Postural Assessment:          Rounded shoulder posture  Palpation:          Tender over whole L shoulder, especially anterior  ROM:          AROM R shoulder flex 140, extn 60, abduct 155, ER 60, IR T9        AROM L shoulder flex 68, extn 30, abduct 60, ER 10 and IR to L hip        PROM L shoulder flex 85, abduct 60, ER 65 and IR 45 at 45 degree abduct  Strength:          R shoulder 5 except 4+ in ER.   L shoulder painful with strength testing in all directions  Special Tests:          Could not elevate L shoulder enough for special tests  Neurological Screen:        Sensation: light touch intact in B UEs   Body Structures Involved:  1. Joints  2. Muscles Body Functions Affected:  1. Neuromusculoskeletal  2. Movement Related Activities and Participation Affected:  1. General Tasks and Demands  2. Self Care  3. Community, Social and Civic Life   CLINICAL DECISION MAKING:   Outcome Measure: Tool Used: Disabilities of the Arm, Shoulder and Hand (DASH) Questionnaire - Quick Version  Score:  Initial: 37/55  Most Recent: X/55 (Date: -- )   Interpretation of Score: The DASH is designed to measure the activities of daily living in person's with upper extremity dysfunction or pain. Each section is scored on a 1-5 scale, 5 representing the greatest disability. The scores of each section are added together for a total score of 55. Score 11 12-19 20-28 29-37 38-45 46-54 55   Modifier CH CI CJ CK CL CM CN     ? Carrying, Moving, and Handling Objects:     - CURRENT STATUS: CK - 40%-59% impaired, limited or restricted    - GOAL STATUS: CJ - 20%-39% impaired, limited or restricted    - D/C STATUS:  ---------------To be determined---------------      Medical Necessity:   · Patient demonstrates fair rehab potential due to higher previous functional level. Reason for Services/Other Comments:  · Patient continues to require skilled intervention due to need to regain use of L UE.            TREATMENT:   (In addition to Assessment/Re-Assessment sessions the following treatments were rendered)  Pre-treatment Symptoms/Complaints:  Patient reports she couldn't sleep last night, not so much from pain, but her whole body was restless. Shoulder is about the same. Not doing any exercises at home. Pain: Initial:   Pain Intensity 1: 7  Post Session:  Pain was less at end of session again, per patient     Therapeutic Exercise: ( ):   None today, but gave patient additional copy of original HEP which she has not done recently.    Manual Therapy ( 40 minutes) - Grade 2 to 4- physio mobs L shoulder flex, abduct, IR and ER. Grade 2 shoulder oscillations. HEP - continue current HEP     Treatment/Session Assessment:    · Response to Treatment:  Patient notes that shoulder again feels better after treatment. Needs to restart HEP for motion to maintain motion in between sessions. · Compliance with Program/Exercises: no  · Recommendations/Intent for next treatment session: \"Next visit will focus on shoulder ROM\".   Total Treatment Duration:   40 minutes  PT Patient Time In/Time Out  Time In: 1030  Time Out: Lizzie Babb

## 2018-03-08 ENCOUNTER — HOSPITAL ENCOUNTER (OUTPATIENT)
Dept: PHYSICAL THERAPY | Age: 83
Discharge: HOME | End: 2018-03-08
Payer: COMMERCIAL

## 2018-03-08 PROCEDURE — 97140 MANUAL THERAPY 1/> REGIONS: CPT

## 2018-03-08 PROCEDURE — 97110 THERAPEUTIC EXERCISES: CPT

## 2018-03-08 NOTE — PROGRESS NOTES
Jenny Elena  : 1934  Primary: Marciano Jones Rpn  Secondary:  [facility] SAIDA BUSH  [address] [facility], [address] [place] [address],[address], [address]  [phone]   [phone]         OUTPATIENT PHYSICAL THERAPY:Daily Note 3/8/2018    ICD-10: Treatment Diagnosis: Pain in left shoulder (M25.512)  Precautions/Allergies: NKDA     Fall Risk Score: 7 (? 5 = High Risk)  MD Orders: Eval and treat, HEP, ROM, strengthening, aggressive motion 3x/wk for 5 weeks MEDICAL/REFERRING DIAGNOSIS:  L shoulder pain  DATE OF ONSET: November? - patient unsure  REFERRING PHYSICIAN: Som Lawton MD  RETURN PHYSICIAN APPOINTMENT: TBD     INITIAL ASSESSMENT:  Ms. Victorino Quintero is a 80year old R hand dominant female whose L shoulder pain started after she moved a mattress. She presents with L shoulder pain, very decreased ROM and strength in L shoulder and decreased functional use of L UE. She could benefit from PT to address deficits and work toward goals. PROBLEM LIST (Impacting functional limitations):  1. Decreased Strength  2. Decreased ADL/Functional Activities  3. Increased Pain  4. Decreased Flexibility/Joint Mobility INTERVENTIONS PLANNED:  1. Home Exercise Program (HEP)  2. Manual Therapy  3. Range of Motion (ROM)  4. Therapeutic Exercise/Strengthening  5. modals as needed   TREATMENT PLAN:  Effective Dates:18 TO 18. Frequency/Duration: 2-3 times a week for 90 Days  GOALS: (Goals have been discussed and agreed upon with patient.)  Patient's stated goal is to get rid of pain and get normal use of L UE back. Short-Term Functional Goals: Time Frame: 6 weeks  1. Patient to be independent with HEP  2. Patient to report decrease in resting pain levels to 4/10.  3. Patient to increase PROM L shoulder flex to 120 degrees  Discharge Goals: Time Frame: 90 days  1. Patient to report no more than minimal L shoulder pain with all activity  2.  Patient to improve DASH score to 24 to demo improved use of L UE  3. Patient to increase AROM L shoulder flex to 135 for improved functional use  Rehabilitation Potential For Stated Goals: Fair         Do not click here        The information in this section was collected on 18 (except where otherwise noted). HISTORY:   History of Present Injury/Illness (Reason for Referral):  Patient reports she injured her shoulder around last November when she moved a mattress at home. She came for PT eval, but fell and broke 'tail bone' after that and couldn't return to PT. She went to see Dr. Gibson Frausto and shoulder was injected and that provided relief for a couple of months, but then the effects wore off. She is now referred to PT. Past Medical History/Comorbidities: CAD, Cognitive impairment, insomnia, left ventricular dysfunction, appendectomy, bunionextomy, heart catheterization, carpal tunnel release, cataract removal, , CABG. 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 Dominant Side:         RIGHT  Current Medications:  Aspirin, Atorvastatin, Calcium, Magnesium oxide, Multivitamin, Mirtazapine, Donepezil - No change since last PT, per patient. Date Last Reviewed:  3/6/18   EXAMINATION:   Observation/Orthostatic Postural Assessment:          Rounded shoulder posture  Palpation:          Tender over whole L shoulder, especially anterior  ROM:          AROM R shoulder flex 140, extn 60, abduct 155, ER 60, IR T9        AROM L shoulder flex 68, extn 30, abduct 60, ER 10 and IR to L hip        PROM L shoulder flex 85, abduct 60, ER 65 and IR 45 at 45 degree abduct  Strength:          R shoulder 5 except 4+ in ER.   L shoulder painful with strength testing in all directions  Special Tests:          Could not elevate L shoulder enough for special tests  Neurological Screen:        Sensation: light touch intact in B UEs   Body Structures Involved:  1. Joints  2. Muscles Body Functions Affected:  1. Neuromusculoskeletal  2. Movement Related Activities and Participation Affected:  1. General Tasks and Demands  2. Self Care  3. Community, Social and Civic Life   CLINICAL DECISION MAKING:   Outcome Measure: Tool Used: Disabilities of the Arm, Shoulder and Hand (DASH) Questionnaire - Quick Version  Score:  Initial: 37/55  Most Recent: X/55 (Date: -- )   Interpretation of Score: The DASH is designed to measure the activities of daily living in person's with upper extremity dysfunction or pain. Each section is scored on a 1-5 scale, 5 representing the greatest disability. The scores of each section are added together for a total score of 55. Score 11 12-19 20-28 29-37 38-45 46-54 55   Modifier CH CI CJ CK CL CM CN     ? Carrying, Moving, and Handling Objects:     - CURRENT STATUS: CK - 40%-59% impaired, limited or restricted    - GOAL STATUS: CJ - 20%-39% impaired, limited or restricted    - D/C STATUS:  ---------------To be determined---------------      Medical Necessity:   · Patient demonstrates fair rehab potential due to higher previous functional level. Reason for Services/Other Comments:  · Patient continues to require skilled intervention due to need to regain use of L UE.            TREATMENT:   (In addition to Assessment/Re-Assessment sessions the following treatments were rendered)  Pre-treatment Symptoms/Complaints:  Patient reports she couldn't sleep last night, not so much from pain, but her whole body was restless. Shoulder is about the same. Not doing any exercises at home. Pain: Initial:   Pain Intensity 1: 7  Post Session:  Pain was less at end of session again, per patient     Therapeutic Exercise: (10 Minutes):  Exercises to improve strength. Required moderate verbal and tactile cues to ensure correct performance.    Manually resisted 5 way shoulder isometrics 2 x 10 ea  Manual Therapy ( 30 minutes) - Grade 2 to 4- physio mobs L shoulder flex, abduct, IR and ER. Grade 2 shoulder oscillations. HEP - continue current HEP     Treatment/Session Assessment:    · Response to Treatment:  Patient notes that shoulder again feels better after treatment, but overall there is not much progress in pain decreasing. Not sure if she is compliant with HEP again. · Compliance with Program/Exercises: unsure  · Recommendations/Intent for next treatment session: \"Next visit will focus on shoulder ROM\". And gentle strengthening.    Total Treatment Duration:   40 minutes  PT Patient Time In/Time Out  Time In: 1033  Time Out: 118 [facility] Ranae Snellen

## 2018-03-13 ENCOUNTER — HOSPITAL ENCOUNTER (OUTPATIENT)
Dept: PHYSICAL THERAPY | Age: 83
Discharge: HOME | End: 2018-03-13
Payer: COMMERCIAL

## 2018-03-13 PROCEDURE — 97110 THERAPEUTIC EXERCISES: CPT

## 2018-03-13 PROCEDURE — 97140 MANUAL THERAPY 1/> REGIONS: CPT

## 2018-03-13 NOTE — PROGRESS NOTES
Alex Dueñas  : 1934  Primary: [place]keem[place]jorie Irene Karen Rpn  Secondary:  [facility] SAIDA BUSH  [address] [facility], [address] [place] [address],[address], [address]  [phone]   [phone]         OUTPATIENT PHYSICAL THERAPY:Daily Note 3/13/2018    ICD-10: Treatment Diagnosis: Pain in left shoulder (M25.512)  Precautions/Allergies: NKDA     Fall Risk Score: 7 (? 5 = High Risk)  MD Orders: Eval and treat, HEP, ROM, strengthening, aggressive motion 3x/wk for 5 weeks MEDICAL/REFERRING DIAGNOSIS:  L shoulder pain  DATE OF ONSET: November? - patient unsure  REFERRING PHYSICIAN: Jj Cage MD  RETURN PHYSICIAN APPOINTMENT: TBD     INITIAL ASSESSMENT:  Ms. Riya Wilder is a 80year old R hand dominant female whose L shoulder pain started after she moved a mattress. She presents with L shoulder pain, very decreased ROM and strength in L shoulder and decreased functional use of L UE. She could benefit from PT to address deficits and work toward goals. PROBLEM LIST (Impacting functional limitations):  1. Decreased Strength  2. Decreased ADL/Functional Activities  3. Increased Pain  4. Decreased Flexibility/Joint Mobility INTERVENTIONS PLANNED:  1. Home Exercise Program (HEP)  2. Manual Therapy  3. Range of Motion (ROM)  4. Therapeutic Exercise/Strengthening  5. modals as needed   TREATMENT PLAN:  Effective Dates:18 TO 18. Frequency/Duration: 2-3 times a week for 90 Days  GOALS: (Goals have been discussed and agreed upon with patient.)  Patient's stated goal is to get rid of pain and get normal use of L UE back. Short-Term Functional Goals: Time Frame: 6 weeks  1. Patient to be independent with HEP  2. Patient to report decrease in resting pain levels to 4/10.  3. Patient to increase PROM L shoulder flex to 120 degrees  Discharge Goals: Time Frame: 90 days  1. Patient to report no more than minimal L shoulder pain with all activity  2.  Patient to improve DASH score to 24 to demo improved use of L UE  3. Patient to increase AROM L shoulder flex to 135 for improved functional use  Rehabilitation Potential For Stated Goals: Fair         Do not click here        The information in this section was collected on 18 (except where otherwise noted). HISTORY:   History of Present Injury/Illness (Reason for Referral):  Patient reports she injured her shoulder around last November when she moved a mattress at home. She came for PT eval, but fell and broke 'tail bone' after that and couldn't return to PT. She went to see Dr. Marvin Alegre and shoulder was injected and that provided relief for a couple of months, but then the effects wore off. She is now referred to PT. Past Medical History/Comorbidities: CAD, Cognitive impairment, insomnia, left ventricular dysfunction, appendectomy, bunionextomy, heart catheterization, carpal tunnel release, cataract removal, , CABG. 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 Dominant Side:         RIGHT  Current Medications:  Aspirin, Atorvastatin, Calcium, Magnesium oxide, Multivitamin, Mirtazapine, Donepezil - No change since last PT, per patient. Date Last Reviewed:  3/13/18   EXAMINATION:   Observation/Orthostatic Postural Assessment:          Rounded shoulder posture  Palpation:          Tender over whole L shoulder, especially anterior  ROM:          AROM R shoulder flex 140, extn 60, abduct 155, ER 60, IR T9        AROM L shoulder flex 68, extn 30, abduct 60, ER 10 and IR to L hip        PROM L shoulder flex 85, abduct 60, ER 65 and IR 45 at 45 degree abduct          PROM L shoulder flex 150, abduct 113, ER 50 and IR 70 (90 degrees abduct)  (3/13/18  Strength:          R shoulder 5 except 4+ in ER.   L shoulder painful with strength testing in all directions  Special Tests:          Could not elevate L shoulder enough for special tests  Neurological Screen:        Sensation: light touch intact in B UEs   Body Structures Involved:  1. Joints  2. Muscles Body Functions Affected:  1. Neuromusculoskeletal  2. Movement Related Activities and Participation Affected:  1. General Tasks and Demands  2. Self Care  3. Community, Social and Civic Life   CLINICAL DECISION MAKING:   Outcome Measure: Tool Used: Disabilities of the Arm, Shoulder and Hand (DASH) Questionnaire - Quick Version  Score:  Initial: 37/55  Most Recent: X/55 (Date: -- )   Interpretation of Score: The DASH is designed to measure the activities of daily living in person's with upper extremity dysfunction or pain. Each section is scored on a 1-5 scale, 5 representing the greatest disability. The scores of each section are added together for a total score of 55. Score 11 12-19 20-28 29-37 38-45 46-54 55   Modifier CH CI CJ CK CL CM CN     ? Carrying, Moving, and Handling Objects:     - CURRENT STATUS: CK - 40%-59% impaired, limited or restricted    - GOAL STATUS: CJ - 20%-39% impaired, limited or restricted    - D/C STATUS:  ---------------To be determined---------------      Medical Necessity:   · Patient demonstrates fair rehab potential due to higher previous functional level. Reason for Services/Other Comments:  · Patient continues to require skilled intervention due to need to regain use of L UE.            TREATMENT:   (In addition to Assessment/Re-Assessment sessions the following treatments were rendered)  Pre-treatment Symptoms/Complaints:  Patient reports her shoulder is getting better over all. No new problems. Not sure when she goes back to MD.   Pain: Initial:   Pain Intensity 1: 0 (None at rest, but pain with movement)  Post Session:  No pain at rest at end of session, but her back is sore from lying down. Therapeutic Exercise: (10 Minutes):  Exercises to improve strength.   Required moderate verbal and tactile cues to ensure correct performance. Manually resisted 5 way shoulder isometrics 2 x 10 ea  Manual Therapy ( 30 minutes) - Grade 2 to 4- physio mobs L shoulder flex, abduct, IR and ER. Grade 2 shoulder oscillations. HEP - continue current HEP     Treatment/Session Assessment:    · Response to Treatment:  Patient notes that shoulder again feels better after treatment. ROM is much improved since initial evaluation. · Compliance with Program/Exercises: unsure  · Recommendations/Intent for next treatment session: \"Next visit will focus on shoulder ROM\".     Total Treatment Duration:   40 minutes  PT Patient Time In/Time Out  Time In: 1031  Time Out: 118 [facility] Chase Huerta

## 2018-03-15 ENCOUNTER — HOSPITAL ENCOUNTER (OUTPATIENT)
Dept: PHYSICAL THERAPY | Age: 83
Discharge: HOME | End: 2018-03-15
Payer: COMMERCIAL

## 2018-03-15 PROCEDURE — 97110 THERAPEUTIC EXERCISES: CPT

## 2018-03-15 PROCEDURE — 97140 MANUAL THERAPY 1/> REGIONS: CPT

## 2018-03-15 NOTE — PROGRESS NOTES
Jovon Menchaca  : 1934  Primary: Tamiko Pascual Karen Rpn  Secondary:  [facility] SAIDA BUSH  [address] [facility], [address] [place] [address],[address], [facility] [address]  [phone]   [phone]         OUTPATIENT PHYSICAL THERAPY:Daily Note 3/15/2018    ICD-10: Treatment Diagnosis: Pain in left shoulder (M25.512)  Precautions/Allergies: NKDA     Fall Risk Score: 7 (? 5 = High Risk)  MD Orders: Eval and treat, HEP, ROM, strengthening, aggressive motion 3x/wk for 5 weeks MEDICAL/REFERRING DIAGNOSIS:  L shoulder pain  DATE OF ONSET: November? - patient unsure  REFERRING PHYSICIAN: Dhruv Alvarado MD  RETURN PHYSICIAN APPOINTMENT: TBD     INITIAL ASSESSMENT:  Ms. Almaz Kaur is a 80year old R hand dominant female whose L shoulder pain started after she moved a mattress. She presents with L shoulder pain, very decreased ROM and strength in L shoulder and decreased functional use of L UE. She could benefit from PT to address deficits and work toward goals. PROBLEM LIST (Impacting functional limitations):  1. Decreased Strength  2. Decreased ADL/Functional Activities  3. Increased Pain  4. Decreased Flexibility/Joint Mobility INTERVENTIONS PLANNED:  1. Home Exercise Program (HEP)  2. Manual Therapy  3. Range of Motion (ROM)  4. Therapeutic Exercise/Strengthening  5. modals as needed   TREATMENT PLAN:  Effective Dates:18 TO 18. Frequency/Duration: 2-3 times a week for 90 Days  GOALS: (Goals have been discussed and agreed upon with patient.)  Patient's stated goal is to get rid of pain and get normal use of L UE back. Short-Term Functional Goals: Time Frame: 6 weeks  1. Patient to be independent with HEP  2. Patient to report decrease in resting pain levels to 4/10.  3. Patient to increase PROM L shoulder flex to 120 degrees  Discharge Goals: Time Frame: 90 days  1. Patient to report no more than minimal L shoulder pain with all activity  2.  Patient to improve DASH score to 24 to demo improved use of L UE  3. Patient to increase AROM L shoulder flex to 135 for improved functional use  Rehabilitation Potential For Stated Goals: Fair         Do not click here        The information in this section was collected on 18 (except where otherwise noted). HISTORY:   History of Present Injury/Illness (Reason for Referral):  Patient reports she injured her shoulder around last November when she moved a mattress at home. She came for PT eval, but fell and broke 'tail bone' after that and couldn't return to PT. She went to see Dr. Malcom Nickerson and shoulder was injected and that provided relief for a couple of months, but then the effects wore off. She is now referred to PT. Past Medical History/Comorbidities: CAD, Cognitive impairment, insomnia, left ventricular dysfunction, appendectomy, bunionextomy, heart catheterization, carpal tunnel release, cataract removal, , CABG. 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 Dominant Side:         RIGHT  Current Medications:  Aspirin, Atorvastatin, Calcium, Magnesium oxide, Multivitamin, Mirtazapine, Donepezil - No change since last PT, per patient. Date Last Reviewed:  3/13/18   EXAMINATION:   Observation/Orthostatic Postural Assessment:          Rounded shoulder posture  Palpation:          Tender over whole L shoulder, especially anterior  ROM:          AROM R shoulder flex 140, extn 60, abduct 155, ER 60, IR T9        AROM L shoulder flex 68, extn 30, abduct 60, ER 10 and IR to L hip        PROM L shoulder flex 85, abduct 60, ER 65 and IR 45 at 45 degree abduct          PROM L shoulder flex 150, abduct 113, ER 50 and IR 70 (90 degrees abduct)  (3/13/18  Strength:          R shoulder 5 except 4+ in ER.   L shoulder painful with strength testing in all directions  Special Tests:          Could not elevate L shoulder enough for special tests  Neurological Screen:        Sensation: light touch intact in B UEs   Body Structures Involved:  1. Joints  2. Muscles Body Functions Affected:  1. Neuromusculoskeletal  2. Movement Related Activities and Participation Affected:  1. General Tasks and Demands  2. Self Care  3. Community, Social and Civic Life   CLINICAL DECISION MAKING:   Outcome Measure: Tool Used: Disabilities of the Arm, Shoulder and Hand (DASH) Questionnaire - Quick Version  Score:  Initial: 37/55  Most Recent: X/55 (Date: -- )   Interpretation of Score: The DASH is designed to measure the activities of daily living in person's with upper extremity dysfunction or pain. Each section is scored on a 1-5 scale, 5 representing the greatest disability. The scores of each section are added together for a total score of 55. Score 11 12-19 20-28 29-37 38-45 46-54 55   Modifier CH CI CJ CK CL CM CN     ? Carrying, Moving, and Handling Objects:     - CURRENT STATUS: CK - 40%-59% impaired, limited or restricted    - GOAL STATUS: CJ - 20%-39% impaired, limited or restricted    - D/C STATUS:  ---------------To be determined---------------      Medical Necessity:   · Patient demonstrates fair rehab potential due to higher previous functional level. Reason for Services/Other Comments:  · Patient continues to require skilled intervention due to need to regain use of L UE.            TREATMENT:   (In addition to Assessment/Re-Assessment sessions the following treatments were rendered)  Pre-treatment Symptoms/Complaints:  Patient reports she is already tired when she arrived at PT. Stated that her back and her shoulder were still a little sore. Pain: Initial:   Pain Intensity 1: 5  Post Session:  No change in pain reported by patient. Therapeutic Exercise: (15 Minutes):  Exercises to improve strength. Required moderate verbal and tactile cues to ensure correct performance.    Manually resisted 5 way shoulder isometrics 2 x 10 ea. Shoulder flexion 2x10 and L serratus puch 2x10 - both from beach chair position. Manual Therapy ( 25 minutes) - Grade 2 to 4- physio mobs L shoulder flex, abduct, IR and ER. Grade 2 shoulder oscillations. HEP - continue current HEP     Treatment/Session Assessment:    · Response to Treatment:  Patient noted that it was better for her back doing PT from beach chair position rather than supine. Did okay with new exercises with no increased pain reported. · Compliance with Program/Exercises: unsure  · Recommendations/Intent for next treatment session: \"Next visit will focus on shoulder ROM\".     Total Treatment Duration:   40 minutes  PT Patient Time In/Time Out  Time In: 9652  Time Out: Jessy Clements

## 2018-03-20 ENCOUNTER — HOSPITAL ENCOUNTER (OUTPATIENT)
Dept: PHYSICAL THERAPY | Age: 83
Discharge: HOME | End: 2018-03-20
Payer: COMMERCIAL

## 2018-03-20 PROCEDURE — 97140 MANUAL THERAPY 1/> REGIONS: CPT

## 2018-03-20 PROCEDURE — 97110 THERAPEUTIC EXERCISES: CPT

## 2018-03-20 NOTE — PROGRESS NOTES
Marita Cota  : 1934  Primary: Claudia Carrera Karen Hassan  Secondary:  [facility] SAIDA RANDOLPHA  1101 [facility], [address] [place] [address],[address], [address]  [phone]   [phone]         OUTPATIENT PHYSICAL THERAPY:Daily Note 3/20/2018    ICD-10: Treatment Diagnosis: Pain in left shoulder (M25.512)  Precautions/Allergies: NKDA     Fall Risk Score: 7 (? 5 = High Risk)  MD Orders: Eval and treat, HEP, ROM, strengthening, aggressive motion 3x/wk for 5 weeks MEDICAL/REFERRING DIAGNOSIS:  L shoulder pain  DATE OF ONSET: November? - patient unsure  REFERRING PHYSICIAN: Simon Lauren MD  RETURN PHYSICIAN APPOINTMENT: TBD     INITIAL ASSESSMENT:  Ms. Maria Fernanda Garner is a 80year old R hand dominant female whose L shoulder pain started after she moved a mattress. She presents with L shoulder pain, very decreased ROM and strength in L shoulder and decreased functional use of L UE. She could benefit from PT to address deficits and work toward goals. PROBLEM LIST (Impacting functional limitations):  1. Decreased Strength  2. Decreased ADL/Functional Activities  3. Increased Pain  4. Decreased Flexibility/Joint Mobility INTERVENTIONS PLANNED:  1. Home Exercise Program (HEP)  2. Manual Therapy  3. Range of Motion (ROM)  4. Therapeutic Exercise/Strengthening  5. modals as needed   TREATMENT PLAN:  Effective Dates:18 TO 18. Frequency/Duration: 2-3 times a week for 90 Days  GOALS: (Goals have been discussed and agreed upon with patient.)  Patient's stated goal is to get rid of pain and get normal use of L UE back. Short-Term Functional Goals: Time Frame: 6 weeks  1. Patient to be independent with HEP  2. Patient to report decrease in resting pain levels to 4/10.  3. Patient to increase PROM L shoulder flex to 120 degrees  Discharge Goals: Time Frame: 90 days  1. Patient to report no more than minimal L shoulder pain with all activity  2.  Patient to improve DASH score to 24 to demo improved use of L UE  3. Patient to increase AROM L shoulder flex to 135 for improved functional use  Rehabilitation Potential For Stated Goals: Fair         Do not click here        The information in this section was collected on 18 (except where otherwise noted). HISTORY:   History of Present Injury/Illness (Reason for Referral):  Patient reports she injured her shoulder around last November when she moved a mattress at home. She came for PT eval, but fell and broke 'tail bone' after that and couldn't return to PT. She went to see Dr. Margaret Chamberlain and shoulder was injected and that provided relief for a couple of months, but then the effects wore off. She is now referred to PT. Past Medical History/Comorbidities: CAD, Cognitive impairment, insomnia, left ventricular dysfunction, appendectomy, bunionextomy, heart catheterization, carpal tunnel release, cataract removal, , CABG. 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 Dominant Side:         RIGHT  Current Medications:  Aspirin, Atorvastatin, Calcium, Magnesium oxide, Multivitamin, Mirtazapine, Donepezil - No change since last PT, per patient. Date Last Reviewed:  3/20/18   EXAMINATION:   Observation/Orthostatic Postural Assessment:          Rounded shoulder posture  Palpation:          Tender over whole L shoulder, especially anterior  ROM:          AROM R shoulder flex 140, extn 60, abduct 155, ER 60, IR T9        AROM L shoulder flex 68, extn 30, abduct 60, ER 10 and IR to L hip        PROM L shoulder flex 85, abduct 60, ER 65 and IR 45 at 45 degree abduct          PROM L shoulder flex 150, abduct 113, ER 50 and IR 70 (90 degrees abduct)  (3/13/18  Strength:          R shoulder 5 except 4+ in ER.   L shoulder painful with strength testing in all directions  Special Tests:          Could not elevate L shoulder enough for special tests  Neurological Screen:        Sensation: light touch intact in B UEs   Body Structures Involved:  1. Joints  2. Muscles Body Functions Affected:  1. Neuromusculoskeletal  2. Movement Related Activities and Participation Affected:  1. General Tasks and Demands  2. Self Care  3. Community, Social and Civic Life   CLINICAL DECISION MAKING:   Outcome Measure: Tool Used: Disabilities of the Arm, Shoulder and Hand (DASH) Questionnaire - Quick Version  Score:  Initial: 37/55  Most Recent: X/55 (Date: -- )   Interpretation of Score: The DASH is designed to measure the activities of daily living in person's with upper extremity dysfunction or pain. Each section is scored on a 1-5 scale, 5 representing the greatest disability. The scores of each section are added together for a total score of 55. Score 11 12-19 20-28 29-37 38-45 46-54 55   Modifier CH CI CJ CK CL CM CN     ? Carrying, Moving, and Handling Objects:     - CURRENT STATUS: CK - 40%-59% impaired, limited or restricted    - GOAL STATUS: CJ - 20%-39% impaired, limited or restricted    - D/C STATUS:  ---------------To be determined---------------      Medical Necessity:   · Patient demonstrates fair rehab potential due to higher previous functional level. Reason for Services/Other Comments:  · Patient continues to require skilled intervention due to need to regain use of L UE.            TREATMENT:   (In addition to Assessment/Re-Assessment sessions the following treatments were rendered)  Pre-treatment Symptoms/Complaints:  Patient reports her shoulder is doing a little better. She had a lot of company from out of town over the weekend. She doesn't think she has a follow up with Dr. Kiki Henriquez so she may make an appointment. Pain: Initial:   Pain Intensity 1: 4  Post Session:  No change in pain reported by patient. Therapeutic Exercise: (15 Minutes):  Exercises to improve strength.   Required moderate verbal and tactile cues to ensure correct performance. Manually resisted 5 way shoulder isometrics 2 x 10 ea. Shoulder flexion 1# 2x10 and L serratus punch 1# 2x10 - both from beach chair position. Manual Therapy ( 25 minutes) - Grade 2 to 4- physio mobs L shoulder flex, abduct, IR and ER. Grade 2 shoulder oscillations. HEP - continue current HEP     Treatment/Session Assessment:    · Response to Treatment:  Patient gradually progressing in strength. Notes that she only has one more PT appointment scheduled so she will try and get back to see MD.    · Compliance with Program/Exercises: unsure  · Recommendations/Intent for next treatment session: \"Next visit will focus on shoulder ROM and gentle strengthening\".     Total Treatment Duration:   40 minutes  PT Patient Time In/Time Out  Time In: 4357  Time Out: Toi 13 Matthew Spencer

## 2018-03-22 ENCOUNTER — HOSPITAL ENCOUNTER (OUTPATIENT)
Dept: PHYSICAL THERAPY | Age: 83
Discharge: HOME | End: 2018-03-22
Payer: COMMERCIAL

## 2018-03-22 PROCEDURE — G8984 CARRY CURRENT STATUS: HCPCS

## 2018-03-22 PROCEDURE — 97140 MANUAL THERAPY 1/> REGIONS: CPT

## 2018-03-22 PROCEDURE — G8985 CARRY GOAL STATUS: HCPCS

## 2018-03-22 NOTE — PROGRESS NOTES
Amada Fisher  : 1934  Primary: Juan Jones Rpn  Secondary:  [facility] SAIDA BUSH  1101 [facility], [address] [place] [address] 83,[address], [address] [facility]  [phone]   [phone]         OUTPATIENT PHYSICAL THERAPY:Progress Report 3/22/2018    ICD-10: Treatment Diagnosis: Pain in left shoulder (M25.512)  Precautions/Allergies: NKDA     Fall Risk Score: 7 (? 5 = High Risk)  MD Orders: Eval and treat, HEP, ROM, strengthening, aggressive motion 3x/wk for 5 weeks  Patient has attended 9 PT sessions from 18 to 3/22/18 with no missed appointments MEDICAL/REFERRING DIAGNOSIS:  L shoulder pain  DATE OF ONSET: November? - patient unsure  REFERRING PHYSICIAN: Ray Trujillo MD  RETURN PHYSICIAN APPOINTMENT: 4/3/18     ASSESSMENT:  Ms. Dat Franco is a 80year old R hand dominant female whose L shoulder pain started after she moved a mattress. She presented with L shoulder pain, very decreased ROM and strength in L shoulder and decreased functional use of L UE. She has progress in ROM and her DASH score is improved, but no yet at goal level. She continues to have pain at end range. She could benefit from continued PT to address deficits and work toward goals. PROBLEM LIST (Impacting functional limitations):  1. Decreased Strength  2. Decreased ADL/Functional Activities  3. Increased Pain  4. Decreased Flexibility/Joint Mobility INTERVENTIONS PLANNED:  1. Home Exercise Program (HEP)  2. Manual Therapy  3. Range of Motion (ROM)  4. Therapeutic Exercise/Strengthening  5. modals as needed   TREATMENT PLAN:  Effective Dates:18 TO 18. Frequency/Duration: 2-3 times a week for 90 Days  GOALS: (Goals have been discussed and agreed upon with patient.)  Patient's stated goal is to get rid of pain and get normal use of L UE back. Short-Term Functional Goals: Time Frame: 6 weeks  1. Patient to be independent with HEP - MET but she admits not doing it often  2.  Patient to report decrease in resting pain levels to 4/10. - Mostly MET  3. Patient to increase PROM L shoulder flex to 120 degrees - MET  Discharge Goals: Time Frame: 90 days - all in progress  1. Patient to report no more than minimal L shoulder pain with all activity  2. Patient to improve DASH score to 24 to demo improved use of L UE  3. Patient to increase AROM L shoulder flex to 135 for improved functional use  Rehabilitation Potential For Stated Goals: Fair         Do not click here        The information in this section was collected on 18 (except where otherwise noted). HISTORY:   History of Present Injury/Illness (Reason for Referral):  Patient reports she injured her shoulder around last November when she moved a mattress at home. She came for PT eval, but fell and broke 'tail bone' after that and couldn't return to PT. She went to see Dr. Yumiko Correia and shoulder was injected and that provided relief for a couple of months, but then the effects wore off. She is now referred to PT. Past Medical History/Comorbidities: CAD, Cognitive impairment, insomnia, left ventricular dysfunction, appendectomy, bunionextomy, heart catheterization, carpal tunnel release, cataract removal, , CABG. 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 Dominant Side:         RIGHT  Current Medications:  Aspirin, Atorvastatin, Calcium, Magnesium oxide, Multivitamin, Mirtazapine, Donepezil - No change since last PT, per patient.    Date Last Reviewed:  3/20/18   EXAMINATION:   Observation/Orthostatic Postural Assessment:          Rounded shoulder posture  Palpation:          Tender over whole L shoulder, especially anterior  ROM:          AROM R shoulder flex 140, extn 60, abduct 155, ER 60, IR T9        AROM L shoulder flex 68, extn 30, abduct 60, ER 10 and IR to L hip        PROM L shoulder flex 85, abduct 60, ER 65 and IR 45 at 45 degree abduct          PROM L shoulder flex 150, abduct 113, ER 50 and IR 70 (90 degrees abduct)  (3/13/18)          PROM L shoulder flex 155, abduct 116, ER 60, IR 70 (90 degrees abduction) (3/22/18)        AROM L shoulder flex 110, extn 50, abduct 95, ER 43, IR L buttock (3/22/18)  Strength:          R shoulder 5 except 4+ in ER. L shoulder painful with strength testing in all directions  Special Tests:          Could not elevate L shoulder enough for special tests  Neurological Screen:        Sensation: light touch intact in B UEs   Body Structures Involved:  1. Joints  2. Muscles Body Functions Affected:  1. Neuromusculoskeletal  2. Movement Related Activities and Participation Affected:  1. General Tasks and Demands  2. Self Care  3. Community, Social and Civic Life   CLINICAL DECISION MAKING:   Outcome Measure: Tool Used: Disabilities of the Arm, Shoulder and Hand (DASH) Questionnaire - Quick Version  Score:  Initial: 37/55  Most Recent: 27/55 (Date: 3/22/18 )   Interpretation of Score: The DASH is designed to measure the activities of daily living in person's with upper extremity dysfunction or pain. Each section is scored on a 1-5 scale, 5 representing the greatest disability. The scores of each section are added together for a total score of 55. Score 11 12-19 20-28 29-37 38-45 46-54 55   Modifier CH CI CJ CK CL CM CN     ? Carrying, Moving, and Handling Objects:     - CURRENT STATUS: CJ - 20%-39% impaired, limited or restricted    - GOAL STATUS: CJ - 20%-39% impaired, limited or restricted    - D/C STATUS:  ---------------To be determined---------------      Medical Necessity:   · Patient demonstrates fair rehab potential due to higher previous functional level.   Reason for Services/Other Comments:  · Patient continues to require skilled intervention due to need to regain use of L UE.            TREATMENT:   (In addition to Assessment/Re-Assessment sessions the following treatments were rendered)  Pre-treatment Symptoms/Complaints:  Patient reports her shoulder is doing a little better. No new complaints today. She called MD office and she is to return there on 4/3/18. Pain: Initial:   Pain Intensity 1: 4  Post Session:  Pain still rated as 4/10 by patient at end of session. Therapeutic Exercise: ( ): None today  Manual Therapy ( 40 minutes) - Grade 2 to 4- physio mobs L shoulder flex, abduct, IR and ER. Grade 2 shoulder oscillations. HEP - continue current HEP     Treatment/Session Assessment:    · Response to Treatment:  Patient gradually progressing. See full assessment above. · Compliance with Program/Exercises: unsure  · Recommendations/Intent for next treatment session: \"Next visit will focus on shoulder ROM and gentle strengthening\".     Total Treatment Duration:   40 minutes  PT Patient Time In/Time Out  Time In: 1030  Time Out: Toi [address] [place] [address]

## 2018-03-22 NOTE — PROGRESS NOTES
Jovon Menchaca  : 1934  Primary: Tamiko Garner Karen Rpn  Secondary:  [address]  [facility] [facility] [address], [address]  [phone]   [phone]         OUTPATIENT PHYSICAL THERAPY:MD Note 3/22/2018    ICD-10: Treatment Diagnosis: Pain in left shoulder (M25.512)  Precautions/Allergies: NKDA     Fall Risk Score: 7 (? 5 = High Risk)  MD Orders: Eval and treat, HEP, ROM, strengthening, aggressive motion 3x/wk for 5 weeks  Patient has attended 9 PT sessions from 18 to 3/22/18 with no missed appointments MEDICAL/REFERRING DIAGNOSIS:  L shoulder pain  DATE OF ONSET: November? - patient unsure  REFERRING PHYSICIAN: Dhruv Alvarado MD  RETURN PHYSICIAN APPOINTMENT: 4/3/18     GOALS:   Patient's stated goal is to get rid of pain and get normal use of L UE back. Short-Term Functional Goals: Time Frame: 6 weeks  1. Patient to be independent with HEP - MET but she admits not doing it often  2. Patient to report decrease in resting pain levels to 4/10. - Mostly MET  3. Patient to increase PROM L shoulder flex to 120 degrees - MET  Discharge Goals: Time Frame: 90 days - all in progress  1. Patient to report no more than minimal L shoulder pain with all activity  2. Patient to improve DASH score to 24 to demo improved use of L UE  3. Patient to increase AROM L shoulder flex to 135 for improved functional use       ROM:          AROM R shoulder flex 140, extn 60, abduct 155, ER 60, IR T9 (at eval)        AROM L shoulder flex 68, extn 30, abduct 60, ER 10 and IR to L hip  (at eval)        PROM L shoulder flex 85, abduct 60, ER 65 and IR 45 at 45 degree abduct  (at eval)          PROM L shoulder flex 155, abduct 116, ER 60, IR 70 (90 degrees abduction) (3/22/18)        AROM L shoulder flex 110, extn 50, abduct 95, ER 43, IR L buttock (3/22/18)    ASSESSMENT:  Ms. Almaz Kaur is a 80year old R hand dominant female whose L shoulder pain started after she moved a mattress. She presented with L shoulder pain, very decreased ROM and strength in L shoulder and decreased functional use of L UE. She has progress in ROM and her DASH score is improved, but no yet at goal level. She continues to have pain at end range. She could benefit from continued PT to address deficits and work toward goals.       Thank you for this referral,      Joie Powell, PT

## 2018-05-02 NOTE — THERAPY DISCHARGE
Angelica Head  : 1934  Primary: [employer] [facility]  Secondary:  [facility]rapy Center at [address] [place] [address] Rd  1101 [facility], [address] [place] [address],[address], [facility] [address]  [phone]   [phone]         OUTPATIENT PHYSICAL THERAPY:Discontinuation Summary 2018     ICD-10: Treatment Diagnosis: Pain in left shoulder (M25.512)  Precautions/Allergies: NKDA     Fall Risk Score: 7 (? 5 = High Risk)  MD Orders: Eval and treat, HEP, ROM, strengthening, aggressive motion 3x/wk for 5 weeks  Patient attended 9 PT sessions from 18 to 3/22/18 with no missed appointments MEDICAL/REFERRING DIAGNOSIS:  L shoulder pain  DATE OF ONSET: November? - patient unsure  REFERRING PHYSICIAN: Jose Miguel Fuller, MD  RETURN PHYSICIAN APPOINTMENT: 4/3/18     ASSESSMENT:  Ms. Sukhjinder Parker is a 80year old R hand dominant female whose L shoulder pain started after she moved a mattress. She presented with L shoulder pain, very decreased ROM and strength in L shoulder and decreased functional use of L UE. She had progress in ROM and her DASH score was improved, but no yet at goal level. She continued to have pain at end range. She did not return to PT after last MD appointment and she will now be discontinued from PT. TREATMENT PLAN: Discontinue PT     GOALS: (Goals have been discussed and agreed upon with patient.)  Patient's stated goal is to get rid of pain and get normal use of L UE back. Short-Term Functional Goals: Time Frame: 6 weeks  1. Patient to be independent with HEP - MET but she admits not doing it often  2. Patient to report decrease in resting pain levels to 4/10. - Mostly MET  3. Patient to increase PROM L shoulder flex to 120 degrees - MET  Discharge Goals: Time Frame: 90 days - all in progress  1. Patient to report no more than minimal L shoulder pain with all activity  2. Patient to improve DASH score to 24 to demo improved use of L UE  3.  Patient to increase AROM L shoulder flex to 135 for improved functional use  Rehabilitation Potential For Stated Goals: Fair       Thank you for this referral,      Mary Harvey, PT               Do not click here        The information in this section was collected on 18 (except where otherwise noted). HISTORY:   History of Present Injury/Illness (Reason for Referral):  Patient reports she injured her shoulder around last November when she moved a mattress at home. She came for PT eval, but fell and broke 'tail bone' after that and couldn't return to PT. She went to see Dr. Scotty Adamson and shoulder was injected and that provided relief for a couple of months, but then the effects wore off. She is now referred to PT. Past Medical History/Comorbidities: CAD, Cognitive impairment, insomnia, left ventricular dysfunction, appendectomy, bunionextomy, heart catheterization, carpal tunnel release, cataract removal, , CABG. 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 Dominant Side:         RIGHT  Current Medications:  Aspirin, Atorvastatin, Calcium, Magnesium oxide, Multivitamin, Mirtazapine, Donepezil - No change since last PT, per patient.    Date Last Reviewed:  3/20/18   EXAMINATION:   Observation/Orthostatic Postural Assessment:          Rounded shoulder posture  Palpation:          Tender over whole L shoulder, especially anterior  ROM:          AROM R shoulder flex 140, extn 60, abduct 155, ER 60, IR T9        AROM L shoulder flex 68, extn 30, abduct 60, ER 10 and IR to L hip        PROM L shoulder flex 85, abduct 60, ER 65 and IR 45 at 45 degree abduct          PROM L shoulder flex 150, abduct 113, ER 50 and IR 70 (90 degrees abduct)  (3/13/18)          PROM L shoulder flex 155, abduct 116, ER 60, IR 70 (90 degrees abduction) (3/22/18)        AROM L shoulder flex 110, extn 50, abduct 95, ER 43, IR L buttock (3/22/18)  Strength:          R shoulder 5 except 4+ in ER. L shoulder painful with strength testing in all directions  Special Tests:          Could not elevate L shoulder enough for special tests  Neurological Screen:        Sensation: light touch intact in B UEs   Body Structures Involved:  1. Joints  2. Muscles Body Functions Affected:  1. Neuromusculoskeletal  2. Movement Related Activities and Participation Affected:  1. General Tasks and Demands  2. Self Care  3. Community, Social and Civic Life   CLINICAL DECISION MAKING:   Outcome Measure: Tool Used: Disabilities of the Arm, Shoulder and Hand (DASH) Questionnaire - Quick Version  Score:  Initial: 37/55  Most Recent: 27/55 (Date: 3/22/18 )   Interpretation of Score: The DASH is designed to measure the activities of daily living in person's with upper extremity dysfunction or pain. Each section is scored on a 1-5 scale, 5 representing the greatest disability. The scores of each section are added together for a total score of 55. Score 11 12-19 20-28 29-37 38-45 46-54 55   Modifier CH CI CJ CK CL CM CN     ?  Carrying, Moving, and Handling Objects:     - CURRENT STATUS: CJ - 20%-39% impaired, limited or restricted    - GOAL STATUS: CJ - 20%-39% impaired, limited or restricted    - D/C STATUS:  ---------------To be determined---------------

## 2019-01-24 ENCOUNTER — HOSPITAL ENCOUNTER (OUTPATIENT)
Dept: MAMMOGRAPHY | Age: 84
Discharge: HOME | End: 2019-01-24
Attending: INTERNAL MEDICINE
Payer: COMMERCIAL

## 2019-01-24 DIAGNOSIS — Z12.31 VISIT FOR SCREENING MAMMOGRAM: ICD-10-CM

## 2019-01-24 PROCEDURE — 77067 SCR MAMMO BI INCL CAD: CPT

## 2021-05-04 ENCOUNTER — HOME HEALTH ADMISSION (OUTPATIENT)
Dept: HOME HEALTH SERVICES | Facility: HOME HEALTH | Age: 86
End: 2021-05-04
Payer: COMMERCIAL

## 2021-05-05 ENCOUNTER — HOME CARE VISIT (OUTPATIENT)
Dept: SCHEDULING | Facility: HOME HEALTH | Age: 86
End: 2021-05-05
Payer: COMMERCIAL

## 2021-05-05 VITALS
DIASTOLIC BLOOD PRESSURE: 80 MMHG | RESPIRATION RATE: 18 BRPM | HEART RATE: 66 BPM | SYSTOLIC BLOOD PRESSURE: 132 MMHG | OXYGEN SATURATION: 97 % | TEMPERATURE: 97.4 F

## 2021-05-05 PROCEDURE — 400013 HH SOC

## 2021-05-05 PROCEDURE — G0151 HHCP-SERV OF PT,EA 15 MIN: HCPCS

## 2021-05-07 ENCOUNTER — HOME CARE VISIT (OUTPATIENT)
Dept: SCHEDULING | Facility: HOME HEALTH | Age: 86
End: 2021-05-07
Payer: COMMERCIAL

## 2021-05-07 VITALS
DIASTOLIC BLOOD PRESSURE: 80 MMHG | HEART RATE: 80 BPM | TEMPERATURE: 97.9 F | RESPIRATION RATE: 17 BRPM | SYSTOLIC BLOOD PRESSURE: 128 MMHG

## 2021-05-07 PROCEDURE — G0157 HHC PT ASSISTANT EA 15: HCPCS

## 2021-05-10 ENCOUNTER — HOME CARE VISIT (OUTPATIENT)
Dept: SCHEDULING | Facility: HOME HEALTH | Age: 86
End: 2021-05-10
Payer: COMMERCIAL

## 2021-05-10 VITALS
RESPIRATION RATE: 17 BRPM | SYSTOLIC BLOOD PRESSURE: 120 MMHG | DIASTOLIC BLOOD PRESSURE: 82 MMHG | HEART RATE: 76 BPM | TEMPERATURE: 98.1 F

## 2021-05-10 PROCEDURE — G0157 HHC PT ASSISTANT EA 15: HCPCS

## 2021-05-14 ENCOUNTER — HOME CARE VISIT (OUTPATIENT)
Dept: SCHEDULING | Facility: HOME HEALTH | Age: 86
End: 2021-05-14
Payer: COMMERCIAL

## 2021-05-14 VITALS
HEART RATE: 76 BPM | RESPIRATION RATE: 18 BRPM | SYSTOLIC BLOOD PRESSURE: 120 MMHG | TEMPERATURE: 98.1 F | DIASTOLIC BLOOD PRESSURE: 80 MMHG

## 2021-05-14 PROCEDURE — G0157 HHC PT ASSISTANT EA 15: HCPCS

## 2021-05-18 ENCOUNTER — HOME CARE VISIT (OUTPATIENT)
Dept: SCHEDULING | Facility: HOME HEALTH | Age: 86
End: 2021-05-18
Payer: COMMERCIAL

## 2021-05-18 ENCOUNTER — HOME CARE VISIT (OUTPATIENT)
Dept: HOME HEALTH SERVICES | Facility: HOME HEALTH | Age: 86
End: 2021-05-18
Payer: COMMERCIAL

## 2021-05-18 VITALS
SYSTOLIC BLOOD PRESSURE: 130 MMHG | DIASTOLIC BLOOD PRESSURE: 84 MMHG | RESPIRATION RATE: 17 BRPM | HEART RATE: 76 BPM | TEMPERATURE: 98 F

## 2021-05-18 PROCEDURE — G0157 HHC PT ASSISTANT EA 15: HCPCS

## 2021-05-21 ENCOUNTER — HOME CARE VISIT (OUTPATIENT)
Dept: HOME HEALTH SERVICES | Facility: HOME HEALTH | Age: 86
End: 2021-05-21
Payer: COMMERCIAL

## 2021-05-21 PROCEDURE — G0151 HHCP-SERV OF PT,EA 15 MIN: HCPCS

## 2021-05-22 VITALS
DIASTOLIC BLOOD PRESSURE: 74 MMHG | TEMPERATURE: 98.5 F | RESPIRATION RATE: 16 BRPM | HEART RATE: 90 BPM | OXYGEN SATURATION: 95 % | SYSTOLIC BLOOD PRESSURE: 130 MMHG